# Patient Record
Sex: MALE | Race: WHITE
[De-identification: names, ages, dates, MRNs, and addresses within clinical notes are randomized per-mention and may not be internally consistent; named-entity substitution may affect disease eponyms.]

---

## 2019-01-10 ENCOUNTER — HOSPITAL ENCOUNTER (EMERGENCY)
Dept: HOSPITAL 53 - M ED | Age: 39
Discharge: HOME | End: 2019-01-10
Payer: COMMERCIAL

## 2019-01-10 VITALS — DIASTOLIC BLOOD PRESSURE: 80 MMHG | SYSTOLIC BLOOD PRESSURE: 124 MMHG

## 2019-01-10 VITALS — BODY MASS INDEX: 40.25 KG/M2 | WEIGHT: 265.55 LBS | HEIGHT: 68 IN
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
## 2019-01-10 DIAGNOSIS — S30.0XXA: ICD-10-CM

## 2019-01-10 DIAGNOSIS — Z72.0: ICD-10-CM

## 2019-01-10 DIAGNOSIS — S06.0X0A: Primary | ICD-10-CM
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
## 2019-01-10 DIAGNOSIS — Z79.899: ICD-10-CM
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
## 2019-01-10 PROCEDURE — 99283 EMERGENCY DEPT VISIT LOW MDM: CPT

## 2021-01-02 ENCOUNTER — HOSPITAL ENCOUNTER (OUTPATIENT)
Dept: HOSPITAL 53 - M LABSMTC | Age: 41
End: 2021-01-02
Attending: ANESTHESIOLOGY
Payer: COMMERCIAL

## 2021-01-02 DIAGNOSIS — Z20.828: ICD-10-CM

## 2021-01-02 DIAGNOSIS — Z01.812: Primary | ICD-10-CM

## 2021-01-07 ENCOUNTER — HOSPITAL ENCOUNTER (OUTPATIENT)
Dept: HOSPITAL 53 - M SDC | Age: 41
Discharge: HOME | End: 2021-01-07
Attending: SURGERY
Payer: COMMERCIAL

## 2021-01-07 VITALS — SYSTOLIC BLOOD PRESSURE: 137 MMHG | DIASTOLIC BLOOD PRESSURE: 91 MMHG

## 2021-01-07 VITALS — HEIGHT: 69 IN | WEIGHT: 295 LBS | BODY MASS INDEX: 43.69 KG/M2

## 2021-01-07 DIAGNOSIS — F17.218: ICD-10-CM

## 2021-01-07 DIAGNOSIS — K21.9: ICD-10-CM

## 2021-01-07 DIAGNOSIS — K42.9: Primary | ICD-10-CM

## 2021-01-07 DIAGNOSIS — Z79.899: ICD-10-CM

## 2021-01-07 DIAGNOSIS — F41.9: ICD-10-CM

## 2021-01-07 PROCEDURE — 49652: CPT

## 2021-01-07 PROCEDURE — 93005 ELECTROCARDIOGRAM TRACING: CPT

## 2021-01-09 NOTE — ECGEPIP
[facility]

                                       

                                       Test Date:    2021

Pat Name:     GERALDINE MURPHY            Department:   

Patient ID:   [id number]                 Room:         -

Gender:       Male                     Technician:   

:          1980               Requested By: Travis KAMINSKI

Order Number: [id number]     Reading MD:   Shiva Tavarez

                                 Measurements

Intervals                              Axis          

Rate:         67                       P:            56

AK:           140                      QRS:          23

QRSD:         87                       T:            40

QT:           382                                    

QTc:          404                                    

                           Interpretive Statements

SINUS RHYTHM

Normal

Electronically Signed on 2021 11:08:11 EST by Shiva Tavarez

## 2021-04-09 ENCOUNTER — HOSPITAL ENCOUNTER (OUTPATIENT)
Dept: HOSPITAL 53 - M RAD | Age: 41
End: 2021-04-09
Attending: FAMILY MEDICINE
Payer: COMMERCIAL

## 2021-04-09 DIAGNOSIS — K21.9: Primary | ICD-10-CM

## 2021-04-09 NOTE — REP
INDICATION:

INCREASED GERD.



COMPARISON:

None.



TECHNIQUE:

The procedure was performed under the direct supervision of Dr. Eastman.  The images were

reviewed with Dr. Eastman.



Liquid barium and gas producing crystals were given in the erect position as well as

liquid barium in the prone oblique position in order to perform a double contrast

upper GI examination. Additionally liquid barium was given at the end of the

examination in order to perform a small bowel follow through.



2.9 minutes of fluoro time was utilized for this procedure.



FINDINGS:

The  film shows no organomegaly or pathological masses. The intestinal gas

pattern is non-specific.



The oral and pharyngeal stages of deglutition are unremarkable.  Esophageal transport

is prompt and efficient and there is no esophagitis, stricture, mucosal ring or hiatal

hernia.  Gastroesophageal reflux is not demonstrated on this examination



The stomach walls are normally outlined. The rugal folds are smooth and regular.

There is no gastritis neoplasm or ulcer disease.



In the post bulbar duodenum there are thickened folds which may represent duodenitis.

There is no lizabeth ulcer identified.  The visualized portion of the proximal small

bowel appears normal in course and caliber.



The barium column was followed through the small bowel to the level of the terminal

ileum.  Small bowel transit time is approximately 1 hour.  During fluoroscopy gentle

palpation shows all loops are freely movable and pliable. There are no fixed or

angulated loops. The small bowel mucosal pattern is normal in course and caliber.

There is no transition to suggest a partial small-bowel obstruction.  Spot filming of

the terminal ileum shows it to be unremarkable.



IMPRESSION:

In the post bulbar duodenum there are thickened folds which may represent duodenitis.

There is no lizabeth ulcer identified.  Otherwise, unremarkable double contrast upper GI

and small bowel follow through examination.



<Electronically signed by Rigo Disla > 04/09/21 [id number]

<Electronically signed by Morgan Eastman > 04/09/21 [id number]

## 2022-03-29 ENCOUNTER — HOSPITAL ENCOUNTER (EMERGENCY)
Dept: HOSPITAL 53 - M ED | Age: 42
Discharge: HOME | End: 2022-03-29
Payer: COMMERCIAL

## 2022-03-29 VITALS
HEIGHT: 69 IN | DIASTOLIC BLOOD PRESSURE: 94 MMHG | BODY MASS INDEX: 43.78 KG/M2 | WEIGHT: 295.62 LBS | SYSTOLIC BLOOD PRESSURE: 158 MMHG

## 2022-03-29 DIAGNOSIS — R94.31: ICD-10-CM

## 2022-03-29 DIAGNOSIS — Z82.49: ICD-10-CM

## 2022-03-29 DIAGNOSIS — K21.9: ICD-10-CM

## 2022-03-29 DIAGNOSIS — R07.9: Primary | ICD-10-CM

## 2022-03-29 LAB
ALBUMIN SERPL BCG-MCNC: 3.7 GM/DL (ref 3.2–5.2)
ALT SERPL W P-5'-P-CCNC: 45 U/L (ref 12–78)
BASOPHILS # BLD AUTO: 0.1 10^3/UL (ref 0–0.2)
BASOPHILS NFR BLD AUTO: 0.6 % (ref 0–1)
BILIRUB CONJ SERPL-MCNC: 0.1 MG/DL (ref 0–0.2)
BILIRUB SERPL-MCNC: 0.4 MG/DL (ref 0.2–1)
BUN SERPL-MCNC: 11 MG/DL (ref 7–18)
CALCIUM SERPL-MCNC: 9.3 MG/DL (ref 8.5–10.1)
CHLORIDE SERPL-SCNC: 105 MEQ/L (ref 98–107)
CO2 SERPL-SCNC: 30 MEQ/L (ref 21–32)
CREAT SERPL-MCNC: 1.02 MG/DL (ref 0.7–1.3)
EOSINOPHIL # BLD AUTO: 0.1 10^3/UL (ref 0–0.5)
EOSINOPHIL NFR BLD AUTO: 1.2 % (ref 0–3)
GFR SERPL CREATININE-BSD FRML MDRD: > 60 ML/MIN/{1.73_M2} (ref 60–?)
GLUCOSE SERPL-MCNC: 101 MG/DL (ref 70–100)
HCT VFR BLD AUTO: 44.6 % (ref 42–52)
HGB BLD-MCNC: 15 G/DL (ref 13.5–17.5)
LIPASE SERPL-CCNC: 166 U/L (ref 73–393)
LYMPHOCYTES # BLD AUTO: 2.8 10^3/UL (ref 1.5–5)
LYMPHOCYTES NFR BLD AUTO: 27.2 % (ref 24–44)
MCH RBC QN AUTO: 28.8 PG (ref 27–33)
MCHC RBC AUTO-ENTMCNC: 33.6 G/DL (ref 32–36.5)
MCV RBC AUTO: 85.6 FL (ref 80–96)
MONOCYTES # BLD AUTO: 0.5 10^3/UL (ref 0–0.8)
MONOCYTES NFR BLD AUTO: 4.6 % (ref 2–8)
NEUTROPHILS # BLD AUTO: 6.8 10^3/UL (ref 1.5–8.5)
NEUTROPHILS NFR BLD AUTO: 65.8 % (ref 36–66)
PLATELET # BLD AUTO: 244 10^3/UL (ref 150–450)
POTASSIUM SERPL-SCNC: 4.4 MEQ/L (ref 3.5–5.1)
PROT SERPL-MCNC: 7.3 GM/DL (ref 6.4–8.2)
RBC # BLD AUTO: 5.21 10^6/UL (ref 4.3–6.1)
SODIUM SERPL-SCNC: 141 MEQ/L (ref 136–145)
WBC # BLD AUTO: 10.3 10^3/UL (ref 4–10)

## 2022-03-31 ENCOUNTER — HOSPITAL ENCOUNTER (OUTPATIENT)
Dept: HOSPITAL 53 - M LAB REF | Age: 42
End: 2022-03-31
Attending: STUDENT IN AN ORGANIZED HEALTH CARE EDUCATION/TRAINING PROGRAM
Payer: COMMERCIAL

## 2022-03-31 DIAGNOSIS — E78.5: ICD-10-CM

## 2022-03-31 DIAGNOSIS — R07.9: Primary | ICD-10-CM

## 2022-03-31 DIAGNOSIS — Z82.49: ICD-10-CM

## 2022-09-12 ENCOUNTER — HOSPITAL ENCOUNTER (OUTPATIENT)
Dept: HOSPITAL 53 - M LAB REF | Age: 42
End: 2022-09-12
Attending: STUDENT IN AN ORGANIZED HEALTH CARE EDUCATION/TRAINING PROGRAM
Payer: COMMERCIAL

## 2022-09-12 DIAGNOSIS — R07.9: Primary | ICD-10-CM

## 2022-12-04 ENCOUNTER — HOSPITAL ENCOUNTER (OUTPATIENT)
Dept: HOSPITAL 53 - M LABSMTC | Age: 42
End: 2022-12-04
Attending: ANESTHESIOLOGY
Payer: COMMERCIAL

## 2022-12-04 DIAGNOSIS — Z11.52: ICD-10-CM

## 2022-12-04 DIAGNOSIS — Z01.812: Primary | ICD-10-CM

## 2022-12-09 ENCOUNTER — HOSPITAL ENCOUNTER (OUTPATIENT)
Dept: HOSPITAL 53 - M OPP | Age: 42
Discharge: HOME | End: 2022-12-09
Attending: INTERNAL MEDICINE
Payer: COMMERCIAL

## 2022-12-09 VITALS — DIASTOLIC BLOOD PRESSURE: 66 MMHG | SYSTOLIC BLOOD PRESSURE: 120 MMHG

## 2022-12-09 VITALS — HEIGHT: 68 IN | BODY MASS INDEX: 44.41 KG/M2 | WEIGHT: 293 LBS

## 2022-12-09 DIAGNOSIS — K52.9: ICD-10-CM

## 2022-12-09 DIAGNOSIS — K29.70: ICD-10-CM

## 2022-12-09 DIAGNOSIS — Z80.1: ICD-10-CM

## 2022-12-09 DIAGNOSIS — K64.8: ICD-10-CM

## 2022-12-09 DIAGNOSIS — F41.9: ICD-10-CM

## 2022-12-09 DIAGNOSIS — E11.9: ICD-10-CM

## 2022-12-09 DIAGNOSIS — K64.4: ICD-10-CM

## 2022-12-09 DIAGNOSIS — D12.6: Primary | ICD-10-CM

## 2022-12-09 DIAGNOSIS — Z79.02: ICD-10-CM

## 2022-12-09 DIAGNOSIS — Z99.89: ICD-10-CM

## 2022-12-09 DIAGNOSIS — Z79.84: ICD-10-CM

## 2022-12-09 DIAGNOSIS — K21.00: ICD-10-CM

## 2022-12-09 DIAGNOSIS — Z79.899: ICD-10-CM

## 2022-12-09 DIAGNOSIS — E78.00: ICD-10-CM

## 2022-12-09 DIAGNOSIS — F32.9: ICD-10-CM

## 2022-12-09 DIAGNOSIS — Z87.718: ICD-10-CM

## 2022-12-09 DIAGNOSIS — G47.30: ICD-10-CM

## 2022-12-09 DIAGNOSIS — K62.89: ICD-10-CM

## 2022-12-09 PROCEDURE — 45380 COLONOSCOPY AND BIOPSY: CPT

## 2022-12-09 PROCEDURE — 45385 COLONOSCOPY W/LESION REMOVAL: CPT

## 2022-12-09 PROCEDURE — 43239 EGD BIOPSY SINGLE/MULTIPLE: CPT

## 2022-12-09 PROCEDURE — 91035 G-ESOPH REFLX TST W/ELECTROD: CPT

## 2022-12-09 PROCEDURE — 88305 TISSUE EXAM BY PATHOLOGIST: CPT

## 2024-01-30 ENCOUNTER — HOSPITAL ENCOUNTER (OUTPATIENT)
Dept: HOSPITAL 53 - M RAD | Age: 44
End: 2024-01-30
Attending: FAMILY MEDICINE
Payer: COMMERCIAL

## 2024-01-30 DIAGNOSIS — R22.1: Primary | ICD-10-CM

## 2024-02-16 ENCOUNTER — HOSPITAL ENCOUNTER (OUTPATIENT)
Dept: HOSPITAL 53 - M OPP | Age: 44
Discharge: HOME | End: 2024-02-16
Attending: INTERNAL MEDICINE
Payer: COMMERCIAL

## 2024-02-16 VITALS — DIASTOLIC BLOOD PRESSURE: 72 MMHG | OXYGEN SATURATION: 96 % | SYSTOLIC BLOOD PRESSURE: 132 MMHG

## 2024-02-16 VITALS — BODY MASS INDEX: 43.99 KG/M2 | HEIGHT: 69 IN | WEIGHT: 297 LBS

## 2024-02-16 VITALS — TEMPERATURE: 98 F

## 2024-02-16 DIAGNOSIS — G47.30: ICD-10-CM

## 2024-02-16 DIAGNOSIS — K22.70: ICD-10-CM

## 2024-02-16 DIAGNOSIS — Z99.89: ICD-10-CM

## 2024-02-16 DIAGNOSIS — E11.9: ICD-10-CM

## 2024-02-16 DIAGNOSIS — Z80.0: Primary | ICD-10-CM

## 2024-02-16 DIAGNOSIS — K31.89: ICD-10-CM

## 2024-02-16 DIAGNOSIS — F17.200: ICD-10-CM

## 2024-02-16 DIAGNOSIS — Z79.02: ICD-10-CM

## 2024-02-16 DIAGNOSIS — Z79.899: ICD-10-CM

## 2024-02-16 RX ADMIN — SODIUM CHLORIDE ONE MLS/HR: 9 INJECTION, SOLUTION INTRAVENOUS at 06:00
